# Patient Record
(demographics unavailable — no encounter records)

---

## 2024-10-30 NOTE — HISTORY OF PRESENT ILLNESS
[FreeTextEntry1] : 69 yo woman with a history of RA, HTN, dyslipidemia and carotid plaque   has not been feeling well. was treated by pulm for sore throat, cough, congestion for 2-3 weeks with prednisone up until yesterday. weaning off now. bp at home this am was 168/92. felt dizzy and lightheaded. wasnt feeling palpitations at this time. worse when she stood up. eating less. She denies having chest pain, SOB, NAQVI, dizziness, palpitations, orthopnea, PND or syncope   ekg -sr, frequent pacs

## 2024-10-30 NOTE — ASSESSMENT
[FreeTextEntry1] : frequent ectopy -on exam, it sounds as if she is in a bigeminy  -discussed monitoring sxs as she just got off of prednisone  -if sxs remain in 1 week, will repeat monitor   htn-bp mildly elevated -she is currently sick, will evaluate when feeling better and off of steroids   check in in 1 week with nursing team

## 2024-10-30 NOTE — PHYSICAL EXAM
[Normal Conjunctiva] : normal conjunctiva [Normal S1, S2] : normal S1, S2 [Murmur] : murmur [Clear Lung Fields] : clear lung fields [Normal Gait] : normal gait [No Edema] : no edema [Moves all extremities] : moves all extremities [Normal] : alert and oriented, normal memory [de-identified] : II/VI systolic murmur [de-identified] : frequent palpitations

## 2024-10-30 NOTE — PHYSICAL EXAM
[Normal Conjunctiva] : normal conjunctiva [Normal S1, S2] : normal S1, S2 [Murmur] : murmur [Clear Lung Fields] : clear lung fields [Normal Gait] : normal gait [No Edema] : no edema [Moves all extremities] : moves all extremities [Normal] : alert and oriented, normal memory [de-identified] : II/VI systolic murmur [de-identified] : frequent palpitations

## 2024-10-30 NOTE — HISTORY OF PRESENT ILLNESS
[FreeTextEntry1] : 71 yo woman with a history of RA, HTN, dyslipidemia and carotid plaque   has not been feeling well. was treated by pulm for sore throat, cough, congestion for 2-3 weeks with prednisone up until yesterday. weaning off now. bp at home this am was 168/92. felt dizzy and lightheaded. wasnt feeling palpitations at this time. worse when she stood up. eating less. She denies having chest pain, SOB, NAQVI, dizziness, palpitations, orthopnea, PND or syncope   ekg -sr, frequent pacs

## 2024-11-27 NOTE — HISTORY OF PRESENT ILLNESS
[FreeTextEntry1] : 71 year female who comes for Cardiology evaluation prior to removal of hardware in her left foot by Dr Jasso outpatient at [facility] on December 5, 2024. She denies having any chest pain, SOB, NAQVI, dizziness, palpitations, orthopnea, PND or syncope. She is walking 10k steps a day. She can walk 30 blocks without stopping.

## 2024-11-27 NOTE — ASSESSMENT
[FreeTextEntry1] : An EKG was performed to evaluate for arrhythmia and ischemia.  I informed the patient that I did not find a Cardiovascular contraindication to the proposed foot surgery at this time   I encouraged continued risk factor reduction and gradual increase in aerobic activity as tolerated  30   minutes were spent discussing cardiac risk excluding procedure time

## 2024-11-27 NOTE — PHYSICAL EXAM
[Normal Conjunctiva] : normal conjunctiva [Normal S1, S2] : normal S1, S2 [Murmur] : murmur [Clear Lung Fields] : clear lung fields [Normal Gait] : normal gait [No Edema] : no edema [Moves all extremities] : moves all extremities [Normal] : alert and oriented, normal memory [de-identified] : II/VI systolic murmur

## 2025-01-31 NOTE — HISTORY OF PRESENT ILLNESS
[FreeTextEntry1] : Initial Evaluation [de-identified] : 71 F PMHx HTN, osteopenia, early glaucoma, RA here to establish care. Early Glaucoma -- follows with ophtho and taking preventative drops. q6 month checks BP at home 140/88 and a bit better at bedtime. Did not take HCTZ 25 -- takes it MWF due to hyponatremia. Takes amlo 5 and valsartan 160 daily. Has Omron calibrated 3 years ago. Has had edema on amlodipine 10 but OK on 5. Has RA -- only takes ibuprofen during a flare usually 3 times a year for about 48 hours. Has [place] rheumatologist. CRP normal during non-flares. Osteopenia last DEXA April 2023. FHx -- sister and aunt breast cancer so mammo annually Will see GI anyway for fatty liver and colon cancer screening. Has been getting wheezing and lingering cough after URIs. Pulmonologist has been doing CT chest annually. Will establish soon with Dr. Padron. Did PFTs at [facility] recently. Saw an allergist who wanted her on nasal sprays. Uses azelastine and flonase prn.

## 2025-01-31 NOTE — PHYSICAL EXAM
[No Acute Distress] : no acute distress [Normal Sclera/Conjunctiva] : normal sclera/conjunctiva [Normal Outer Ear/Nose] : the outer ears and nose were normal in appearance [No Respiratory Distress] : no respiratory distress  [Normal Gait] : normal gait [Normal] : affect was normal and insight and judgment were intact

## 2025-02-14 NOTE — HISTORY OF PRESENT ILLNESS
[Home] : at home, [unfilled] , at the time of the visit. [Medical Office: ([facility])___] : at the medical office located in  [Telehealth (audio & video)] : This visit was provided via telehealth using real-time 2-way audio visual technology. [Verbal consent obtained from patient] : the patient, [unfilled] [FreeTextEntry1] : virtual follow-up for BP [de-identified] : 71 F PMHx HTN, osteopenia, RA calling for BP check. Has been taking Olmesartan 20mg and amlodipine 5mg. No longer taking HCTZ (hyponatremia). BP [id number] to 146/97 in AM Lunchtime 123/77 as low as 106/70 Evening 135/84 or 145/88 No symptoms or side effects, feeling fine.

## 2025-02-14 NOTE — ASSESSMENT
[FreeTextEntry1] : Data reviewed:  CT chest [facility] 4/2024: stable tiny nodules and tiny focus of reticulation anterior RML, HH  [facility] 1/9/2025 : borderline, ratio 64%, fev1 97%, no BD response / 495m no desat FENO 2/14/2025: 75  Impression: Intermittent asthma Remote 6py smoker  Plan: Her best diagnosis is intermittent asthma triggered by URIs. We will use a strategy of prn Symbicort and see her at least annually, or sooner as needed based on symptoms. She has COPD by GOLD criteria but does not meaningfully have the clinical disease of COPD - reviewed this w her. No sig emphysema to my review of scan. Reviewed vaccines. No need for a follow up scan.

## 2025-02-14 NOTE — HISTORY OF PRESENT ILLNESS
[Former] : former [< 20 pack-years] : < 20 pack-years [TextBox_4] : 02/14/2025: Asked to evaluate patient by Dr Dhillon for cough. When she gets a cold she will have wheezing and nocturnal wheezing, about 3x a year. Saw ENT and told she had allergies and sinusitis. Sent to allergy and told allergic to dust mites and usual environmental allergens. Feels fine today aside from being nasal. Uses nasal sprays. No childhood asthma. Smoked age 20-26 at 1ppd. Retired nurse. Lives [place], no pets, no mold or water damage, scaffolding just went up outside. RA on HCQ.

## 2025-02-14 NOTE — PHYSICAL EXAM
[No Acute Distress] : no acute distress [Normal Rate/Rhythm] : normal rate/rhythm [Normal S1, S2] : normal s1, s2 [No Resp Distress] : no resp distress [Clear to Auscultation Bilaterally] : clear to auscultation bilaterally [No Clubbing] : no clubbing [No Edema] : no edema

## 2025-05-14 NOTE — ASSESSMENT
[FreeTextEntry1] : An EKG was performed to evaluate for arrhythmia and ischemia.  Hypertension--We discussed a goal of /80 or lower in the office. BP  above   goal. Add HCTZ 12.5 mg 3x/week prn  Labs were drawn in the office and sent  I asked her to return for Echo and stress test  I encouraged continued risk factor reduction and gradual increase in aerobic activity as tolerated  32   minutes were spent discussing cardiac risk excluding procedure time

## 2025-05-14 NOTE — PHYSICAL EXAM
[Normal Conjunctiva] : normal conjunctiva [Normal S1, S2] : normal S1, S2 [Murmur] : murmur [Clear Lung Fields] : clear lung fields [Normal Gait] : normal gait [No Edema] : no edema [Moves all extremities] : moves all extremities [Normal] : alert and oriented, normal memory [de-identified] : II/VI systolic murmur

## 2025-05-14 NOTE — HISTORY OF PRESENT ILLNESS
[FreeTextEntry1] : 72 year female who is planning to travel to [place] with her family. She denies having any chest pain, SOB, NAQVI, dizziness, palpitations, orthopnea, PND or syncope

## 2025-06-06 NOTE — HISTORY OF PRESENT ILLNESS
[FreeTextEntry8] : Cough, nasal congestion, runny nose. No headaches fever nausea or vomiting. Home COVID negative. Symptoms started 6/1. Her grandkids were sick as well. Not using OTCs. Feeling better overall just still congested. Taking nasal azelastine. Has used oral antihistamines and Flonase before. R eye glaucoma HTN -- amlo-olmesartan with qod HCTZ 12.5; 138/88 at home. Had hyponatremia previously on hctz

## 2025-06-06 NOTE — REVIEW OF SYSTEMS
[Fever] : no fever [Chills] : no chills [Hoarseness] : hoarseness [Nasal Discharge] : nasal discharge [Postnasal Drip] : postnasal drip [Wheezing] : no wheezing [Cough] : cough [Negative] : Cardiovascular

## 2025-07-16 NOTE — PHYSICAL EXAM
[Normal Conjunctiva] : normal conjunctiva [Murmur] : murmur [Normal Gait] : normal gait [No Edema] : no edema [Moves all extremities] : moves all extremities [Normal] : alert and oriented, normal memory [de-identified] : II/VI systolic murmur

## 2025-07-16 NOTE — ASSESSMENT
[FreeTextEntry1] : At the time of the patient's visit a Carotid Doppler was performed to evaluate for PVD. At the time of the visit the results were reviewed with patient  At the time of the patient's visit an Echocardiogram was performed to evaluate LV function. At the time of the visit the results were reviewed with patient. Ascending aorta has increased in size  At the time of the patient's visit a Stress Test was performed to evaluate for exercise induced arrhythmia and ischemia. At the time of the visit the results were reviewed with patient  Meet CT surgery to join registry with Terrance Garcia and [place]  I encouraged continued risk factor reduction and gradual increase in aerobic activity as tolerated   26  minutes were spent discussing cardiac risk excluding procedure time